# Patient Record
Sex: FEMALE | Race: BLACK OR AFRICAN AMERICAN | NOT HISPANIC OR LATINO | Employment: UNEMPLOYED | URBAN - METROPOLITAN AREA
[De-identification: names, ages, dates, MRNs, and addresses within clinical notes are randomized per-mention and may not be internally consistent; named-entity substitution may affect disease eponyms.]

---

## 2017-08-02 LAB
COLOR STL: NORMAL
CONSISTENCY STL: NORMAL
HEMOCCULT SP1 STL QL: NEGATIVE

## 2017-08-03 LAB
COLOR STL: NORMAL
CONSISTENCY STL: NORMAL
HEMOCCULT SP2 STL QL: NEGATIVE

## 2017-08-04 ENCOUNTER — HISTORICAL (OUTPATIENT)
Dept: INTERNAL MEDICINE | Facility: CLINIC | Age: 82
End: 2017-08-04

## 2017-08-04 LAB
COLOR STL: NORMAL
CONSISTENCY STL: NORMAL

## 2017-08-08 ENCOUNTER — HISTORICAL (OUTPATIENT)
Dept: ADMINISTRATIVE | Facility: HOSPITAL | Age: 82
End: 2017-08-08

## 2017-08-14 ENCOUNTER — HISTORICAL (OUTPATIENT)
Dept: INTERNAL MEDICINE | Facility: CLINIC | Age: 82
End: 2017-08-14

## 2017-08-14 LAB
ABS NEUT (OLG): 2.18 X10(3)/MCL (ref 2.1–9.2)
BASOPHILS # BLD AUTO: 0.04 X10(3)/MCL
BASOPHILS NFR BLD AUTO: 1 % (ref 0–1)
BUN SERPL-MCNC: 16 MG/DL (ref 7–18)
CALCIUM SERPL-MCNC: 9.6 MG/DL (ref 8.5–10.1)
CHLORIDE SERPL-SCNC: 107 MMOL/L (ref 98–107)
CHOLEST SERPL-MCNC: 172 MG/DL
CHOLEST/HDLC SERPL: 1.7 {RATIO} (ref 0–4.4)
CO2 SERPL-SCNC: 27 MMOL/L (ref 21–32)
CREAT SERPL-MCNC: 0.9 MG/DL (ref 0.6–1.3)
CRP SERPL-MCNC: <0.3 MG/DL
DEPRECATED CALCIDIOL+CALCIFEROL SERPL-MC: 42.36 NG/ML (ref 30–80)
EOSINOPHIL # BLD AUTO: 0.08 X10(3)/MCL
EOSINOPHIL NFR BLD AUTO: 2 % (ref 0–5)
ERYTHROCYTE [DISTWIDTH] IN BLOOD BY AUTOMATED COUNT: 14 % (ref 11.5–14.5)
ERYTHROCYTE [SEDIMENTATION RATE] IN BLOOD: 41 MM/HR (ref 0–20)
GLUCOSE SERPL-MCNC: 93 MG/DL (ref 74–106)
HCT VFR BLD AUTO: 31.6 % (ref 35–46)
HDLC SERPL-MCNC: 103 MG/DL
HGB BLD-MCNC: 10.6 GM/DL (ref 12–16)
LDLC SERPL CALC-MCNC: 58 MG/DL (ref 0–130)
LYMPHOCYTES # BLD AUTO: 1.73 X10(3)/MCL
LYMPHOCYTES NFR BLD AUTO: 40 % (ref 15–40)
MCH RBC QN AUTO: 27 PG (ref 26–34)
MCHC RBC AUTO-ENTMCNC: 33.5 GM/DL (ref 31–37)
MCV RBC AUTO: 80.4 FL (ref 80–100)
MONOCYTES # BLD AUTO: 0.28 X10(3)/MCL
MONOCYTES NFR BLD AUTO: 6 % (ref 4–12)
NEUTROPHILS # BLD AUTO: 2.18 X10(3)/MCL
NEUTROPHILS NFR BLD AUTO: 51 X10(3)/MCL
PLATELET # BLD AUTO: 408 X10(3)/MCL (ref 130–400)
PMV BLD AUTO: 8.1 FL (ref 7.4–10.4)
POTASSIUM SERPL-SCNC: 3.8 MMOL/L (ref 3.5–5.1)
RBC # BLD AUTO: 3.93 X10(6)/MCL (ref 4–5.2)
SODIUM SERPL-SCNC: 140 MMOL/L (ref 136–145)
TRIGL SERPL-MCNC: 53 MG/DL
VLDLC SERPL CALC-MCNC: 11 MG/DL
WBC # SPEC AUTO: 4.3 X10(3)/MCL (ref 4.5–11)

## 2017-08-23 ENCOUNTER — HISTORICAL (OUTPATIENT)
Dept: RADIOLOGY | Facility: HOSPITAL | Age: 82
End: 2017-08-23

## 2018-07-11 ENCOUNTER — HISTORICAL (OUTPATIENT)
Dept: LAB | Facility: HOSPITAL | Age: 83
End: 2018-07-11

## 2018-07-17 ENCOUNTER — HISTORICAL (OUTPATIENT)
Dept: INTERNAL MEDICINE | Facility: CLINIC | Age: 83
End: 2018-07-17

## 2018-07-17 LAB
ABS NEUT (OLG): 2.83 X10(3)/MCL (ref 2.1–9.2)
ALBUMIN SERPL-MCNC: 3.7 GM/DL (ref 3.4–5)
ALBUMIN/GLOB SERPL: 1 RATIO (ref 1–2)
ALP SERPL-CCNC: 56 UNIT/L (ref 45–117)
ALT SERPL-CCNC: 18 UNIT/L (ref 12–78)
AST SERPL-CCNC: 19 UNIT/L (ref 15–37)
BASOPHILS # BLD AUTO: 0.04 X10(3)/MCL
BASOPHILS NFR BLD AUTO: 1 %
BILIRUB SERPL-MCNC: 0.3 MG/DL (ref 0.2–1)
BILIRUBIN DIRECT+TOT PNL SERPL-MCNC: <0.1 MG/DL
BILIRUBIN DIRECT+TOT PNL SERPL-MCNC: ABNORMAL MG/DL
BUN SERPL-MCNC: 17 MG/DL (ref 7–18)
CALCIUM SERPL-MCNC: 9.1 MG/DL (ref 8.5–10.1)
CHLORIDE SERPL-SCNC: 105 MMOL/L (ref 98–107)
CO2 SERPL-SCNC: 30 MMOL/L (ref 21–32)
CREAT SERPL-MCNC: 1 MG/DL (ref 0.6–1.3)
EOSINOPHIL # BLD AUTO: 0.08 X10(3)/MCL
EOSINOPHIL NFR BLD AUTO: 2 %
ERYTHROCYTE [DISTWIDTH] IN BLOOD BY AUTOMATED COUNT: 13.7 % (ref 11.5–14.5)
FERRITIN SERPL-MCNC: 140.3 NG/ML (ref 8–388)
GLOBULIN SER-MCNC: 4.2 GM/ML (ref 2.3–3.5)
GLUCOSE SERPL-MCNC: 87 MG/DL (ref 74–106)
HCT VFR BLD AUTO: 37.3 % (ref 35–46)
HGB BLD-MCNC: 12.4 GM/DL (ref 12–16)
IMM GRANULOCYTES # BLD AUTO: 0.01 10*3/UL
IMM GRANULOCYTES NFR BLD AUTO: 0 %
IRON SATN MFR SERPL: 34.8 % (ref 15–50)
IRON SERPL-MCNC: 80 MCG/DL (ref 50–170)
LYMPHOCYTES # BLD AUTO: 2.1 X10(3)/MCL
LYMPHOCYTES NFR BLD AUTO: 39 % (ref 13–40)
MCH RBC QN AUTO: 27.7 PG (ref 26–34)
MCHC RBC AUTO-ENTMCNC: 33.2 GM/DL (ref 31–37)
MCV RBC AUTO: 83.3 FL (ref 80–100)
MONOCYTES # BLD AUTO: 0.35 X10(3)/MCL
MONOCYTES NFR BLD AUTO: 6 % (ref 4–12)
NEUTROPHILS # BLD AUTO: 2.83 X10(3)/MCL
NEUTROPHILS NFR BLD AUTO: 52 X10(3)/MCL
PLATELET # BLD AUTO: 289 X10(3)/MCL (ref 130–400)
PMV BLD AUTO: 8.6 FL (ref 7.4–10.4)
POTASSIUM SERPL-SCNC: 4.2 MMOL/L (ref 3.5–5.1)
PROT SERPL-MCNC: 7.9 GM/DL (ref 6.4–8.2)
RBC # BLD AUTO: 4.48 X10(6)/MCL (ref 4–5.2)
SODIUM SERPL-SCNC: 139 MMOL/L (ref 136–145)
TIBC SERPL-MCNC: 230 MCG/DL (ref 250–450)
TRANSFERRIN SERPL-MCNC: 202 MG/DL (ref 200–360)
WBC # SPEC AUTO: 5.4 X10(3)/MCL (ref 4.5–11)

## 2019-02-21 ENCOUNTER — HISTORICAL (OUTPATIENT)
Dept: INTERNAL MEDICINE | Facility: CLINIC | Age: 84
End: 2019-02-21

## 2019-02-21 LAB
ABS NEUT (OLG): 1.98 X10(3)/MCL (ref 2.1–9.2)
ALBUMIN SERPL-MCNC: 3.3 GM/DL (ref 3.4–5)
ALBUMIN/GLOB SERPL: 0.8 RATIO (ref 1.1–2)
ALP SERPL-CCNC: 51 UNIT/L (ref 45–117)
ALT SERPL-CCNC: 18 UNIT/L (ref 12–78)
AST SERPL-CCNC: 21 UNIT/L (ref 15–37)
BASOPHILS # BLD AUTO: 0.02 X10(3)/MCL
BASOPHILS NFR BLD AUTO: 0 %
BILIRUB SERPL-MCNC: 0.2 MG/DL (ref 0.2–1)
BILIRUBIN DIRECT+TOT PNL SERPL-MCNC: <0.1 MG/DL
BILIRUBIN DIRECT+TOT PNL SERPL-MCNC: ABNORMAL MG/DL
BUN SERPL-MCNC: 19 MG/DL (ref 7–18)
CALCIUM SERPL-MCNC: 9.2 MG/DL (ref 8.5–10.1)
CHLORIDE SERPL-SCNC: 106 MMOL/L (ref 98–107)
CHOLEST SERPL-MCNC: 156 MG/DL
CHOLEST/HDLC SERPL: 2.1 {RATIO} (ref 0–4.4)
CO2 SERPL-SCNC: 29 MMOL/L (ref 21–32)
CREAT SERPL-MCNC: 0.9 MG/DL (ref 0.6–1.3)
EOSINOPHIL # BLD AUTO: 0.11 X10(3)/MCL
EOSINOPHIL NFR BLD AUTO: 3 %
ERYTHROCYTE [DISTWIDTH] IN BLOOD BY AUTOMATED COUNT: 13.4 % (ref 11.5–14.5)
GLOBULIN SER-MCNC: 4.3 GM/ML (ref 2.3–3.5)
GLUCOSE SERPL-MCNC: 80 MG/DL (ref 74–106)
HCT VFR BLD AUTO: 35.3 % (ref 35–46)
HDLC SERPL-MCNC: 73 MG/DL
HGB BLD-MCNC: 11.7 GM/DL (ref 12–16)
IMM GRANULOCYTES # BLD AUTO: 0.02 10*3/UL
IMM GRANULOCYTES NFR BLD AUTO: 0 %
LDLC SERPL CALC-MCNC: 68 MG/DL (ref 0–130)
LYMPHOCYTES # BLD AUTO: 1.86 X10(3)/MCL
LYMPHOCYTES NFR BLD AUTO: 44 % (ref 13–40)
MCH RBC QN AUTO: 27.9 PG (ref 26–34)
MCHC RBC AUTO-ENTMCNC: 33.1 GM/DL (ref 31–37)
MCV RBC AUTO: 84.2 FL (ref 80–100)
MONOCYTES # BLD AUTO: 0.26 X10(3)/MCL
MONOCYTES NFR BLD AUTO: 6 % (ref 4–12)
NEUTROPHILS # BLD AUTO: 1.98 X10(3)/MCL
NEUTROPHILS NFR BLD AUTO: 46 X10(3)/MCL
PLATELET # BLD AUTO: 279 X10(3)/MCL (ref 130–400)
PMV BLD AUTO: 8.5 FL (ref 7.4–10.4)
POTASSIUM SERPL-SCNC: 3.7 MMOL/L (ref 3.5–5.1)
PROT SERPL-MCNC: 7.6 GM/DL (ref 6.4–8.2)
RBC # BLD AUTO: 4.19 X10(6)/MCL (ref 4–5.2)
SODIUM SERPL-SCNC: 141 MMOL/L (ref 136–145)
TRIGL SERPL-MCNC: 73 MG/DL
VLDLC SERPL CALC-MCNC: 15 MG/DL
WBC # SPEC AUTO: 4.2 X10(3)/MCL (ref 4.5–11)

## 2019-08-12 ENCOUNTER — HISTORICAL (OUTPATIENT)
Dept: ADMINISTRATIVE | Facility: HOSPITAL | Age: 84
End: 2019-08-12

## 2019-08-12 LAB — DEPRECATED CALCIDIOL+CALCIFEROL SERPL-MC: 73.61 NG/ML (ref 30–80)

## 2019-09-05 ENCOUNTER — HISTORICAL (OUTPATIENT)
Dept: RADIOLOGY | Facility: HOSPITAL | Age: 84
End: 2019-09-05

## 2019-09-09 ENCOUNTER — HISTORICAL (OUTPATIENT)
Dept: INTERNAL MEDICINE | Facility: CLINIC | Age: 84
End: 2019-09-09

## 2020-02-20 ENCOUNTER — HISTORICAL (OUTPATIENT)
Dept: INTERNAL MEDICINE | Facility: CLINIC | Age: 85
End: 2020-02-20

## 2020-02-20 LAB
ABS NEUT (OLG): 2.24 X10(3)/MCL (ref 2.1–9.2)
BASOPHILS # BLD AUTO: 0 X10(3)/MCL (ref 0–0.2)
BASOPHILS NFR BLD AUTO: 0 %
BUN SERPL-MCNC: 16 MG/DL (ref 7–18)
CALCIUM SERPL-MCNC: 9.8 MG/DL (ref 8.5–10.1)
CHLORIDE SERPL-SCNC: 106 MMOL/L (ref 98–107)
CO2 SERPL-SCNC: 30 MMOL/L (ref 21–32)
CREAT SERPL-MCNC: 1 MG/DL (ref 0.6–1.3)
CREAT UR-MCNC: 66 MG/DL
CREAT/UREA NIT SERPL: 16
DEPRECATED CALCIDIOL+CALCIFEROL SERPL-MC: 61.9 NG/ML (ref 30–80)
EOSINOPHIL # BLD AUTO: 0.1 X10(3)/MCL (ref 0–0.9)
EOSINOPHIL NFR BLD AUTO: 2 %
ERYTHROCYTE [DISTWIDTH] IN BLOOD BY AUTOMATED COUNT: 14.1 % (ref 11.5–14.5)
GLUCOSE SERPL-MCNC: 83 MG/DL (ref 74–106)
HCT VFR BLD AUTO: 35.6 % (ref 35–46)
HGB BLD-MCNC: 11.5 GM/DL (ref 12–16)
IMM GRANULOCYTES # BLD AUTO: 0.01 10*3/UL
IMM GRANULOCYTES NFR BLD AUTO: 0 %
LYMPHOCYTES # BLD AUTO: 1.7 X10(3)/MCL (ref 0.6–4.6)
LYMPHOCYTES NFR BLD AUTO: 38 %
MCH RBC QN AUTO: 27.2 PG (ref 26–34)
MCHC RBC AUTO-ENTMCNC: 32.3 GM/DL (ref 31–37)
MCV RBC AUTO: 84.2 FL (ref 80–100)
MICROALBUMIN UR-MCNC: <5 MG/L (ref 0–19)
MICROALBUMIN/CREAT RATIO PNL UR: <7.6 MCG/MG CR (ref 0–29)
MONOCYTES # BLD AUTO: 0.4 X10(3)/MCL (ref 0.1–1.3)
MONOCYTES NFR BLD AUTO: 8 %
NEUTROPHILS # BLD AUTO: 2.24 X10(3)/MCL (ref 2.1–9.2)
NEUTROPHILS NFR BLD AUTO: 50 %
PLATELET # BLD AUTO: 294 X10(3)/MCL (ref 130–400)
PMV BLD AUTO: 8.5 FL (ref 7.4–10.4)
POTASSIUM SERPL-SCNC: 4.4 MMOL/L (ref 3.5–5.1)
RBC # BLD AUTO: 4.23 X10(6)/MCL (ref 4–5.2)
SODIUM SERPL-SCNC: 139 MMOL/L (ref 136–145)
WBC # SPEC AUTO: 4.4 X10(3)/MCL (ref 4.5–11)

## 2020-08-04 ENCOUNTER — HISTORICAL (OUTPATIENT)
Dept: INTERNAL MEDICINE | Facility: CLINIC | Age: 85
End: 2020-08-04

## 2020-08-04 LAB
ABS NEUT (OLG): 2 X10(3)/MCL (ref 2.1–9.2)
BASOPHILS # BLD AUTO: 0 X10(3)/MCL (ref 0–0.2)
BASOPHILS NFR BLD AUTO: 1 %
BUN SERPL-MCNC: 28 MG/DL (ref 7–18)
CALCIUM SERPL-MCNC: 9.6 MG/DL (ref 8.5–10.1)
CHLORIDE SERPL-SCNC: 108 MMOL/L (ref 98–107)
CO2 SERPL-SCNC: 29 MMOL/L (ref 21–32)
CREAT SERPL-MCNC: 1.2 MG/DL (ref 0.6–1.3)
CREAT/UREA NIT SERPL: 23 MG/DL (ref 12–14)
EOSINOPHIL # BLD AUTO: 0.1 X10(3)/MCL (ref 0–0.9)
EOSINOPHIL NFR BLD AUTO: 3 %
ERYTHROCYTE [DISTWIDTH] IN BLOOD BY AUTOMATED COUNT: 14.6 % (ref 11.5–14.5)
GLUCOSE SERPL-MCNC: 82 MG/DL (ref 74–106)
HCT VFR BLD AUTO: 34.7 % (ref 35–46)
HGB BLD-MCNC: 11.4 GM/DL (ref 12–16)
IMM GRANULOCYTES # BLD AUTO: 0.01 10*3/UL
IMM GRANULOCYTES NFR BLD AUTO: 0 %
LYMPHOCYTES # BLD AUTO: 1.9 X10(3)/MCL (ref 0.6–4.6)
LYMPHOCYTES NFR BLD AUTO: 44 %
MCH RBC QN AUTO: 27.1 PG (ref 26–34)
MCHC RBC AUTO-ENTMCNC: 32.9 GM/DL (ref 31–37)
MCV RBC AUTO: 82.6 FL (ref 80–100)
MONOCYTES # BLD AUTO: 0.2 X10(3)/MCL (ref 0.1–1.3)
MONOCYTES NFR BLD AUTO: 6 %
NEUTROPHILS # BLD AUTO: 2 X10(3)/MCL (ref 2.1–9.2)
NEUTROPHILS NFR BLD AUTO: 47 %
PLATELET # BLD AUTO: 278 X10(3)/MCL (ref 130–400)
PMV BLD AUTO: 8.5 FL (ref 7.4–10.4)
POTASSIUM SERPL-SCNC: 4.5 MMOL/L (ref 3.5–5.1)
RBC # BLD AUTO: 4.2 X10(6)/MCL (ref 4–5.2)
SODIUM SERPL-SCNC: 139 MMOL/L (ref 136–145)
WBC # SPEC AUTO: 4.3 X10(3)/MCL (ref 4.5–11)

## 2020-09-28 ENCOUNTER — HISTORICAL (OUTPATIENT)
Dept: RADIOLOGY | Facility: HOSPITAL | Age: 85
End: 2020-09-28

## 2020-09-28 LAB
ABS NEUT (OLG): 2.31 X10(3)/MCL (ref 2.1–9.2)
ALBUMIN SERPL-MCNC: 3.7 GM/DL (ref 3.4–5)
ALBUMIN/GLOB SERPL: 0.9 RATIO (ref 1.1–2)
ALP SERPL-CCNC: 53 UNIT/L (ref 45–117)
ALT SERPL-CCNC: 16 UNIT/L (ref 12–78)
APPEARANCE, UA: CLEAR
AST SERPL-CCNC: 19 UNIT/L (ref 15–37)
BACTERIA #/AREA URNS AUTO: ABNORMAL /HPF
BASOPHILS # BLD AUTO: 0 X10(3)/MCL (ref 0–0.2)
BASOPHILS NFR BLD AUTO: 1 %
BILIRUB SERPL-MCNC: 0.4 MG/DL (ref 0.2–1)
BILIRUB UR QL STRIP: NEGATIVE
BILIRUBIN DIRECT+TOT PNL SERPL-MCNC: 0.1 MG/DL (ref 0–0.2)
BILIRUBIN DIRECT+TOT PNL SERPL-MCNC: 0.3 MG/DL
BUN SERPL-MCNC: 23 MG/DL (ref 7–18)
CALCIUM SERPL-MCNC: 9.8 MG/DL (ref 8.5–10.1)
CHLORIDE SERPL-SCNC: 107 MMOL/L (ref 98–107)
CO2 SERPL-SCNC: 29 MMOL/L (ref 21–32)
COLOR UR: NORMAL
CREAT SERPL-MCNC: 1.2 MG/DL (ref 0.6–1.3)
DEPRECATED CALCIDIOL+CALCIFEROL SERPL-MC: 70.1 NG/ML (ref 30–80)
EOSINOPHIL # BLD AUTO: 0.1 X10(3)/MCL (ref 0–0.9)
EOSINOPHIL NFR BLD AUTO: 3 %
ERYTHROCYTE [DISTWIDTH] IN BLOOD BY AUTOMATED COUNT: 14.4 % (ref 11.5–14.5)
GLOBULIN SER-MCNC: 3.9 GM/ML (ref 2.3–3.5)
GLUCOSE (UA): NEGATIVE
GLUCOSE SERPL-MCNC: 88 MG/DL (ref 74–106)
HCT VFR BLD AUTO: 34.6 % (ref 35–46)
HGB BLD-MCNC: 11.4 GM/DL (ref 12–16)
HGB UR QL STRIP: NEGATIVE
HYALINE CASTS #/AREA URNS LPF: ABNORMAL /LPF
KETONES UR QL STRIP: NEGATIVE
LEUKOCYTE ESTERASE UR QL STRIP: NEGATIVE
LYMPHOCYTES # BLD AUTO: 1.8 X10(3)/MCL (ref 0.6–4.6)
LYMPHOCYTES NFR BLD AUTO: 38 %
MCH RBC QN AUTO: 27.5 PG (ref 26–34)
MCHC RBC AUTO-ENTMCNC: 32.9 GM/DL (ref 31–37)
MCV RBC AUTO: 83.4 FL (ref 80–100)
MONOCYTES # BLD AUTO: 0.4 X10(3)/MCL (ref 0.1–1.3)
MONOCYTES NFR BLD AUTO: 8 %
NEUTROPHILS # BLD AUTO: 2.31 X10(3)/MCL (ref 2.1–9.2)
NEUTROPHILS NFR BLD AUTO: 50 %
NITRITE UR QL STRIP: NEGATIVE
PH UR STRIP: 7.5 [PH] (ref 4.5–8)
PLATELET # BLD AUTO: 291 X10(3)/MCL (ref 130–400)
PMV BLD AUTO: 8.9 FL (ref 7.4–10.4)
POTASSIUM SERPL-SCNC: 4.4 MMOL/L (ref 3.5–5.1)
PROT SERPL-MCNC: 7.6 GM/DL (ref 6.4–8.2)
PROT UR QL STRIP: NEGATIVE
RBC # BLD AUTO: 4.15 X10(6)/MCL (ref 4–5.2)
RBC #/AREA URNS AUTO: ABNORMAL /HPF
SODIUM SERPL-SCNC: 139 MMOL/L (ref 136–145)
SP GR UR STRIP: 1.01 (ref 1–1.03)
SQUAMOUS #/AREA URNS LPF: ABNORMAL /LPF
T4 SERPL-MCNC: 9.5 MCG/DL (ref 4.8–13.9)
TSH SERPL-ACNC: 0.82 MIU/L (ref 0.36–3.74)
UROBILINOGEN UR STRIP-ACNC: NORMAL
VIT B12 SERPL-MCNC: 749 PG/ML (ref 193–986)
WBC # SPEC AUTO: 4.6 X10(3)/MCL (ref 4.5–11)
WBC #/AREA URNS AUTO: ABNORMAL /HPF

## 2020-11-02 ENCOUNTER — HISTORICAL (OUTPATIENT)
Dept: RADIOLOGY | Facility: HOSPITAL | Age: 85
End: 2020-11-02

## 2020-11-06 ENCOUNTER — HISTORICAL (OUTPATIENT)
Dept: ADMINISTRATIVE | Facility: HOSPITAL | Age: 85
End: 2020-11-06

## 2020-11-06 LAB
HAV IGM SERPL QL IA: NONREACTIVE
HBV CORE IGM SERPL QL IA: NONREACTIVE
HBV SURFACE AG SERPL QL IA: NONREACTIVE
HCV AB SERPL QL IA: NONREACTIVE
HIV 1+2 AB+HIV1 P24 AG SERPL QL IA: NONREACTIVE
T PALLIDUM AB SER QL: NONREACTIVE

## 2020-11-30 ENCOUNTER — HISTORICAL (OUTPATIENT)
Dept: RADIOLOGY | Facility: HOSPITAL | Age: 85
End: 2020-11-30

## 2020-12-11 ENCOUNTER — HISTORICAL (OUTPATIENT)
Dept: INFUSION THERAPY | Facility: HOSPITAL | Age: 85
End: 2020-12-11

## 2021-03-25 ENCOUNTER — HISTORICAL (OUTPATIENT)
Dept: ADMINISTRATIVE | Facility: HOSPITAL | Age: 86
End: 2021-03-25

## 2021-03-25 LAB
ABS NEUT (OLG): 1.96 X10(3)/MCL (ref 2.1–9.2)
ALBUMIN SERPL-MCNC: 3.6 GM/DL (ref 3.4–4.8)
ALBUMIN/GLOB SERPL: 0.9 RATIO (ref 1.1–2)
ALP SERPL-CCNC: 49 UNIT/L (ref 40–150)
ALT SERPL-CCNC: 12 UNIT/L (ref 0–55)
AST SERPL-CCNC: 21 UNIT/L (ref 5–34)
BASOPHILS # BLD AUTO: 0 X10(3)/MCL (ref 0–0.2)
BASOPHILS NFR BLD AUTO: 0 %
BILIRUB SERPL-MCNC: 0.3 MG/DL
BILIRUBIN DIRECT+TOT PNL SERPL-MCNC: 0.1 MG/DL (ref 0–0.8)
BILIRUBIN DIRECT+TOT PNL SERPL-MCNC: 0.2 MG/DL (ref 0–0.5)
BUN SERPL-MCNC: 16.2 MG/DL (ref 9.8–20.1)
CALCIUM SERPL-MCNC: 9.5 MG/DL (ref 8.4–10.2)
CHLORIDE SERPL-SCNC: 103 MMOL/L (ref 98–111)
CHOLEST SERPL-MCNC: 199 MG/DL
CHOLEST/HDLC SERPL: 3 {RATIO} (ref 0–5)
CO2 SERPL-SCNC: 28 MMOL/L (ref 23–31)
CREAT SERPL-MCNC: 0.98 MG/DL (ref 0.55–1.02)
DEPRECATED CALCIDIOL+CALCIFEROL SERPL-MC: 59.2 NG/ML (ref 30–80)
EOSINOPHIL # BLD AUTO: 0.1 X10(3)/MCL (ref 0–0.9)
EOSINOPHIL NFR BLD AUTO: 2 %
ERYTHROCYTE [DISTWIDTH] IN BLOOD BY AUTOMATED COUNT: 14.6 % (ref 11.5–14.5)
FOLATE SERPL-MCNC: 4.5 NG/ML (ref 7–31.4)
GLOBULIN SER-MCNC: 3.8 GM/DL (ref 2.4–3.5)
GLUCOSE SERPL-MCNC: 81 MG/DL (ref 75–121)
HCT VFR BLD AUTO: 37.1 % (ref 35–46)
HDLC SERPL-MCNC: 78 MG/DL (ref 35–60)
HGB BLD-MCNC: 12 GM/DL (ref 12–16)
LDLC SERPL CALC-MCNC: 106 MG/DL (ref 50–140)
LYMPHOCYTES # BLD AUTO: 1.6 X10(3)/MCL (ref 0.6–4.6)
LYMPHOCYTES NFR BLD AUTO: 41 %
MCH RBC QN AUTO: 26.9 PG (ref 26–34)
MCHC RBC AUTO-ENTMCNC: 32.3 GM/DL (ref 31–37)
MCV RBC AUTO: 83.2 FL (ref 80–100)
MONOCYTES # BLD AUTO: 0.3 X10(3)/MCL (ref 0.1–1.3)
MONOCYTES NFR BLD AUTO: 7 %
NEUTROPHILS # BLD AUTO: 1.96 X10(3)/MCL (ref 2.1–9.2)
NEUTROPHILS NFR BLD AUTO: 50 %
PLATELET # BLD AUTO: 331 X10(3)/MCL (ref 130–400)
PMV BLD AUTO: 8.6 FL (ref 7.4–10.4)
POTASSIUM SERPL-SCNC: 4.2 MMOL/L (ref 3.5–5.1)
PROT SERPL-MCNC: 7.4 GM/DL (ref 5.8–7.6)
RBC # BLD AUTO: 4.46 X10(6)/MCL (ref 4–5.2)
SODIUM SERPL-SCNC: 136 MMOL/L (ref 132–146)
TRIGL SERPL-MCNC: 73 MG/DL (ref 37–140)
TSH SERPL-ACNC: 0.61 UIU/ML (ref 0.35–4.94)
VIT B12 SERPL-MCNC: 637 PG/ML (ref 213–816)
VLDLC SERPL CALC-MCNC: 15 MG/DL
WBC # SPEC AUTO: 3.9 X10(3)/MCL (ref 4.5–11)

## 2021-04-27 ENCOUNTER — HISTORICAL (OUTPATIENT)
Dept: RADIOLOGY | Facility: HOSPITAL | Age: 86
End: 2021-04-27

## 2021-05-11 ENCOUNTER — HISTORICAL (OUTPATIENT)
Dept: ADMINISTRATIVE | Facility: HOSPITAL | Age: 86
End: 2021-05-11

## 2021-05-11 LAB
ABS NEUT (OLG): 2.32 X10(3)/MCL (ref 2.1–9.2)
ALBUMIN SERPL-MCNC: 3.7 GM/DL (ref 3.4–4.8)
ALBUMIN/GLOB SERPL: 1 RATIO (ref 1.1–2)
ALP SERPL-CCNC: 52 UNIT/L (ref 40–150)
ALT SERPL-CCNC: 11 UNIT/L (ref 0–55)
APPEARANCE, UA: CLEAR
AST SERPL-CCNC: 22 UNIT/L (ref 5–34)
BACTERIA #/AREA URNS AUTO: ABNORMAL /HPF
BASOPHILS # BLD AUTO: 0 X10(3)/MCL (ref 0–0.2)
BASOPHILS NFR BLD AUTO: 1 %
BILIRUB SERPL-MCNC: 0.6 MG/DL
BILIRUB UR QL STRIP: NEGATIVE
BILIRUBIN DIRECT+TOT PNL SERPL-MCNC: 0.3 MG/DL (ref 0–0.5)
BILIRUBIN DIRECT+TOT PNL SERPL-MCNC: 0.3 MG/DL (ref 0–0.8)
BUN SERPL-MCNC: 19.6 MG/DL (ref 9.8–20.1)
CALCIUM SERPL-MCNC: 9.7 MG/DL (ref 8.4–10.2)
CHLORIDE SERPL-SCNC: 108 MMOL/L (ref 98–111)
CO2 SERPL-SCNC: 25 MMOL/L (ref 23–31)
COLOR UR: NORMAL
CREAT SERPL-MCNC: 1.27 MG/DL (ref 0.55–1.02)
EOSINOPHIL # BLD AUTO: 0.1 X10(3)/MCL (ref 0–0.9)
EOSINOPHIL NFR BLD AUTO: 2 %
ERYTHROCYTE [DISTWIDTH] IN BLOOD BY AUTOMATED COUNT: 14.2 % (ref 11.5–14.5)
GLOBULIN SER-MCNC: 3.7 GM/DL (ref 2.4–3.5)
GLUCOSE (UA): NEGATIVE
GLUCOSE SERPL-MCNC: 94 MG/DL (ref 75–121)
HCT VFR BLD AUTO: 37.8 % (ref 35–46)
HGB BLD-MCNC: 12.3 GM/DL (ref 12–16)
HGB UR QL STRIP: NEGATIVE
HYALINE CASTS #/AREA URNS LPF: ABNORMAL /LPF
IMM GRANULOCYTES # BLD AUTO: 0.04 10*3/UL
IMM GRANULOCYTES NFR BLD AUTO: 1 %
KETONES UR QL STRIP: NEGATIVE
LEUKOCYTE ESTERASE UR QL STRIP: NEGATIVE
LYMPHOCYTES # BLD AUTO: 1.5 X10(3)/MCL (ref 0.6–4.6)
LYMPHOCYTES NFR BLD AUTO: 35 %
MCH RBC QN AUTO: 27.2 PG (ref 26–34)
MCHC RBC AUTO-ENTMCNC: 32.5 GM/DL (ref 31–37)
MCV RBC AUTO: 83.4 FL (ref 80–100)
MONOCYTES # BLD AUTO: 0.4 X10(3)/MCL (ref 0.1–1.3)
MONOCYTES NFR BLD AUTO: 8 %
NEUTROPHILS # BLD AUTO: 2.32 X10(3)/MCL (ref 2.1–9.2)
NEUTROPHILS NFR BLD AUTO: 53 %
NITRITE UR QL STRIP: NEGATIVE
PH UR STRIP: 6 [PH] (ref 4.5–8)
PLATELET # BLD AUTO: 271 X10(3)/MCL (ref 130–400)
PMV BLD AUTO: 8.6 FL (ref 7.4–10.4)
POTASSIUM SERPL-SCNC: 4.1 MMOL/L (ref 3.5–5.1)
PROT SERPL-MCNC: 7.4 GM/DL (ref 5.8–7.6)
PROT UR QL STRIP: NEGATIVE
RBC # BLD AUTO: 4.53 X10(6)/MCL (ref 4–5.2)
RBC #/AREA URNS AUTO: ABNORMAL /HPF
SODIUM SERPL-SCNC: 140 MMOL/L (ref 132–146)
SP GR UR STRIP: 1.01 (ref 1–1.03)
SQUAMOUS #/AREA URNS LPF: ABNORMAL /LPF
UROBILINOGEN UR STRIP-ACNC: NORMAL
WBC # SPEC AUTO: 4.4 X10(3)/MCL (ref 4.5–11)
WBC #/AREA URNS AUTO: ABNORMAL /HPF

## 2021-05-13 LAB — FINAL CULTURE: NORMAL

## 2021-06-11 ENCOUNTER — HISTORICAL (OUTPATIENT)
Dept: INFUSION THERAPY | Facility: HOSPITAL | Age: 86
End: 2021-06-11

## 2021-07-09 ENCOUNTER — HISTORICAL (OUTPATIENT)
Dept: INTERNAL MEDICINE | Facility: CLINIC | Age: 86
End: 2021-07-09

## 2021-07-09 LAB
ABS NEUT (OLG): 3.24 X10(3)/MCL (ref 2.1–9.2)
ALBUMIN SERPL-MCNC: 3.6 GM/DL (ref 3.4–4.8)
ALBUMIN/GLOB SERPL: 0.9 RATIO (ref 1.1–2)
ALP SERPL-CCNC: 46 UNIT/L (ref 40–150)
ALT SERPL-CCNC: 10 UNIT/L (ref 0–55)
AST SERPL-CCNC: 19 UNIT/L (ref 5–34)
BASOPHILS # BLD AUTO: 0 X10(3)/MCL (ref 0–0.2)
BASOPHILS NFR BLD AUTO: 1 %
BILIRUB SERPL-MCNC: 0.4 MG/DL
BILIRUBIN DIRECT+TOT PNL SERPL-MCNC: 0.2 MG/DL (ref 0–0.5)
BILIRUBIN DIRECT+TOT PNL SERPL-MCNC: 0.2 MG/DL (ref 0–0.8)
BUN SERPL-MCNC: 19.4 MG/DL (ref 9.8–20.1)
CALCIUM SERPL-MCNC: 9.8 MG/DL (ref 8.4–10.2)
CHLORIDE SERPL-SCNC: 109 MMOL/L (ref 98–111)
CO2 SERPL-SCNC: 27 MMOL/L (ref 23–31)
CREAT SERPL-MCNC: 1.23 MG/DL (ref 0.55–1.02)
DEPRECATED CALCIDIOL+CALCIFEROL SERPL-MC: 56.1 NG/ML (ref 30–80)
EOSINOPHIL # BLD AUTO: 0.1 X10(3)/MCL (ref 0–0.9)
EOSINOPHIL NFR BLD AUTO: 1 %
ERYTHROCYTE [DISTWIDTH] IN BLOOD BY AUTOMATED COUNT: 15.1 % (ref 11.5–14.5)
EST. AVERAGE GLUCOSE BLD GHB EST-MCNC: 96.8 MG/DL
GLOBULIN SER-MCNC: 3.8 GM/DL (ref 2.4–3.5)
GLUCOSE SERPL-MCNC: 91 MG/DL (ref 75–121)
HBA1C MFR BLD: 5 %
HCT VFR BLD AUTO: 36.5 % (ref 35–46)
HGB BLD-MCNC: 11.8 GM/DL (ref 12–16)
IMM GRANULOCYTES # BLD AUTO: 0.01 10*3/UL
IMM GRANULOCYTES NFR BLD AUTO: 0 %
LYMPHOCYTES # BLD AUTO: 1.6 X10(3)/MCL (ref 0.6–4.6)
LYMPHOCYTES NFR BLD AUTO: 31 %
MCH RBC QN AUTO: 27.1 PG (ref 26–34)
MCHC RBC AUTO-ENTMCNC: 32.3 GM/DL (ref 31–37)
MCV RBC AUTO: 83.9 FL (ref 80–100)
MONOCYTES # BLD AUTO: 0.3 X10(3)/MCL (ref 0.1–1.3)
MONOCYTES NFR BLD AUTO: 6 %
NEUTROPHILS # BLD AUTO: 3.24 X10(3)/MCL (ref 2.1–9.2)
NEUTROPHILS NFR BLD AUTO: 61 %
NRBC BLD AUTO-RTO: 0 % (ref 0–0.2)
PLATELET # BLD AUTO: 287 X10(3)/MCL (ref 130–400)
PMV BLD AUTO: 8.4 FL (ref 7.4–10.4)
POTASSIUM SERPL-SCNC: 4.7 MMOL/L (ref 3.5–5.1)
PROT SERPL-MCNC: 7.4 GM/DL (ref 5.8–7.6)
RBC # BLD AUTO: 4.35 X10(6)/MCL (ref 4–5.2)
SODIUM SERPL-SCNC: 142 MMOL/L (ref 132–146)
VIT B12 SERPL-MCNC: 791 PG/ML (ref 213–816)
WBC # SPEC AUTO: 5.3 X10(3)/MCL (ref 4.5–11)

## 2021-07-13 ENCOUNTER — HISTORICAL (OUTPATIENT)
Dept: RADIOLOGY | Facility: HOSPITAL | Age: 86
End: 2021-07-13

## 2022-02-14 ENCOUNTER — HISTORICAL (OUTPATIENT)
Dept: ADMINISTRATIVE | Facility: HOSPITAL | Age: 87
End: 2022-02-14

## 2022-02-14 ENCOUNTER — HISTORICAL (OUTPATIENT)
Dept: RADIOLOGY | Facility: HOSPITAL | Age: 87
End: 2022-02-14

## 2022-04-10 ENCOUNTER — HISTORICAL (OUTPATIENT)
Dept: ADMINISTRATIVE | Facility: HOSPITAL | Age: 87
End: 2022-04-10
Payer: COMMERCIAL

## 2022-04-13 DIAGNOSIS — M81.0 AGE RELATED OSTEOPOROSIS, UNSPECIFIED PATHOLOGICAL FRACTURE PRESENCE: ICD-10-CM

## 2022-04-26 VITALS
WEIGHT: 120.56 LBS | DIASTOLIC BLOOD PRESSURE: 64 MMHG | OXYGEN SATURATION: 100 % | SYSTOLIC BLOOD PRESSURE: 103 MMHG | HEIGHT: 59 IN | BODY MASS INDEX: 24.31 KG/M2

## 2022-04-30 NOTE — PROGRESS NOTES
86-year-old black female patient admitted to the hospital in June 2017 with right upper lobe infiltrate and possible lung abscess.  Patient was discharged home on antibiotics.  She tells me she took him for a week or 2 but not long-term.  There are no notes that I can see what antibiotic or how long she took him.  She is completely asymptomatic from from this standpoint.  She specifically denies hemoptysis or mucopurulent sputum or chest pain.  Appetite is good.  Physical exam  HEENT: Unremarkable chest: Clear heart: Regular in rhythm abdomen: Scaphoid extremities: No edema  Laboratory CT scan of the chest is ordered August 14 of 2017.  Impression right upper lobe infiltrate/abscess June 2017.  Plan patient needs to get her outpatient CT chest is will be reviewed by pulmonary doctor here that week if there is an abnormality she will be scheduled for pulmonary clinic if it is completely cleared she will be discharged from the clinic.  There will be no charge for to today's visit

## 2022-05-03 NOTE — HISTORICAL [facility] CERNER
This is a historical note converted from [place]. Formatting and pictures may have been removed.  Please reference [employer] for original formatting and attached multimedia. History of Present Illness  89-year-old female with history of dementia, HTN, osteoporosis, HLD, and unsteady gait presents to geriatric clinic for follow-up. ?Daughter present. Previously seen on 12/17/2020 in [facility], started on donepezil 5 mg at that time. ?SLUMS score 4 then.  ?   -Received COVID-19 both shots  ?   Dementia:  -?SLUMS score of 3, unable to perform Trails task  -Initially talking with patient and daughter in room with the appearance that patient was doing well.? They were unsure patient had diagnosis of dementia and that patient was able to dress herself, bathe herself, cooked at times.? Once we got daughter out of the room, the daughter stated she did not like to talk about the dementia in front of her mother because mother would get agitated.? Daughters states mother is forgetful, will clean clothes in the washer and then forget that they are clean and rewash them.? She probably does not remember to bathe. ?Patient has left things on the stove too long and burned them, so family has told her that doctor does not want her to cook and she has not been cooking lately.??She will be agitated at night and very paranoid about money. Pt does not want anyone to help her with paying bills. I called the son who lives with the patient.? He agreed with what the daughter has stated and says patient will be in her room and seem to be talking to someone but no one is there.? Since starting Donepezil that has helped her he believes in that her agitation is not as much. Pt has 24hr care.  ?   Htn:  -Currently on Lisinopril 5mg  ?   Osteoporosis:  -Currently on Calcium Carbonate 500mg? qd  ?  ?  Geriatric Assessment  -Independent ADLs  -Cannot do IDLS, does not drive  -She does not cook, family will get her plate lunches  -She has good appetite, does  not eat like when she was younger  -Sleeping well  -No falls, not off balance, uses rolling walker everywhere  -Mood is great, enjoys doing regular activities  -Lives with son.  -Denies alcohol, denies smoking, denies drugs  ?  ?  Review of Systems  Gen: no recent fevers or chills  HEENT: no headaches  Card: no chest pain  Resp: no sob  Abd: no n/v/d  Msk: no swelling  Psyche: no depressive thoughts  Physical Exam  Vitals & Measurements  T:?36.7? ?C (Oral)? RR:?20? BP:?137/61? SpO2:?100%?  HT:?150.00?cm? WT:?58.800?kg? BMI:?26.13?  Gen: alert and oriented, pleasant  HEENT: no scleral icterus  Neck: supple  Card: 3/6 systolic murmur best heard at second intercostal space on the right  Resp: clear breath sounds bilaterally  Abd: soft, nontender to palpation  Neuro: CN2-12 grossly intact, (-) disdiadokinesis, (-) finger to nose, (-) heel to shin  Assessment/Plan  1.?HTN (hypertension)?I10  -Continue Lisinopril 5mg  Ordered:  Clinic Follow up, *Est. 05/25/21 3:00:00 CDT, Order for future visit, AD - Alzheimers disease  HTN (hypertension)  Osteoporosis, [facility]  ?  2.?Health care maintenance?Z00.00  ?-Received both COVID vaccines  ?-Routine labs ordered today  ?  3.?Newly recognized heart murmur?R01.1  ?-Echocardiogram complete ordered  Ordered:  Echocardiogram Complete Adult, 03/25/21 23:59:00 CDT, Routine, Abnormal Heart Sound (Murmur), Stop date 03/25/21 23:59:00 CDT, Wheelchair, Standard Precautions, [facility], Order for future visit, Heart murmur  ?  AD - Alzheimers disease?G30.9  - as per HPI  - Continue Donepezil 10mg; will start Memantine 5mg 1 daily x 1?week,? then 1 BID x 1 week, then 2qam x 1 qpm x 1 week,?2 tab BID x 1 week,?then start 10mg 1?BID thereafter  Ordered:  Clinic Follow up, *Est. 05/25/21 3:00:00 CDT, Order for future visit, AD - Alzheimers disease  HTN (hypertension)  Osteoporosis, [facility]  ?  Osteoporosis?M81.0  -Continue Calcium  Carbonate 500mg daily  Ordered:  Clinic Follow up, *Est. 05/25/21 3:00:00 CDT, Order for future visit, AD - Alzheimers disease  HTN (hypertension)  Osteoporosis, [facility]  ?  Orders:  memantine, See Instructions, Take 1 tablet at nigth for 7 days, then take 1 tablet in morning and 1 at night for the next 7 days, then take 2 in the morning and one at night for the next 7 days, and then take 2 in the morning and two at night for the next 7 days...  memantine, 10 mg = 1 tab(s), Oral, BID, # 60 tab(s), 1 Refill(s), Pharmacy: [facility]/pharmacy #[id number], 150, cm, Height/Length Dosing, 03/25/21 13:22:00 CDT, 58.8, kg, Weight Dosing, 03/25/21 13:22:00 CDT  Referrals  Clinic Follow up, *Est. 05/25/21 3:00:00 CDT, Order for future visit, AD - Alzheimers disease  HTN (hypertension)  Osteoporosis, [facility]   Problem List/Past Medical History  Ongoing  AD - Alzheimers disease  HLD (hyperlipidemia)  HTN (hypertension)  Hypertension  Impaired gait and mobility  Osteoarthritis  Historical  No qualifying data  Procedure/Surgical History  denies   Medications  aspirin 81 mg oral tablet, CHEWABLE, 81 mg= 1 tab(s), Chewed, Daily, 6 refills  Blood pressure CUff, See Instructions,? ?Not taking  brimonidine 0.15% ophthalmic solution, 1 drop(s), OPTH, q8hr, 4 refills  calcium (as carbonate) 500 mg oral tablet, chewable, 500 mg= 1 tab(s), Chewed, Daily, 6 refills,? ?Not taking  donepezil 10 mg oral tablet, 10 mg= 1 tab(s), Oral, Once a day (at bedtime)  DORZOLAMIDE-TIMOLOL EYE DROPS  LATANOPROST 0.005% EYE DROPS  lisinopril 5 mg oral tablet, 5 mg= 1 tab(s), Oral, Daily, 3 refills  memantine 10 mg oral tablet, 10 mg= 1 tab(s), Oral, BID, 1 refills  memantine 5 mg oral tablet, See Instructions  Rollator, See Instructions  Allergies  No Known Allergies  Family History  Old age: Mother, Father and Sister.  Immunizations  Vaccine Date Status   COVID-19 MRNA, BEKA, HARRY- Pfizer 02/05/2021 Given   COVID-19 MRNA,  BEKA, HARRY- Pfizer 01/15/2021 Given   pneumococcal 23-polyvalent vaccine 12/17/2020 Given   zoster vaccine, inactivated 11/06/2020 Given   influenza virus vaccine, inactivated 11/06/2020 Given   influenza virus vaccine, inactivated 10/10/2018 Given   zoster vaccine, inactivated 06/22/2018 Recorded   influenza virus vaccine, inactivated 02/12/2018 Given   pneumococcal 13-valent conjugate vaccine 07/28/2017 Given   tetanus/diphtheria/pertussis, acel(Tdap) 07/28/2017 Given       I discussed and agree with the residents findings and plan as documented in the residents note.  ?  AD- agree with starting memantine, Fol-4.5  start folate  son to contnue to set up pillbox  continue 24hr care  This pt may benefit from referral to [facility] program in future (since she needs assistance with ADLs)

## 2022-05-03 NOTE — HISTORICAL [facility] CERNER
This is a historical note converted from [place]. Formatting and pictures may have been removed.  Please reference [place] for original formatting and attached multimedia. Chief Complaint  follow up;  History of Present Illness  A 89-year-old female well-known to me who Im seeing today for evaluation of possible dementia brought in by?son.? Could not do MRI?earlier this week secondary to being claustrophobic.? Patient denies any complaints currently is seen in a wheelchair with her caregiver, says shes able to evaluate her home with a walker?on her own, no chest pain no shortness of breath no nausea vomiting lower extremity swelling syncope/presyncope/LOC/Problems with sleep  ?  MMSE: 13  Review of Systems  Negative unless otherwise stated above  Physical Exam  Vitals & Measurements  T:?36.7? ?C (Oral)? HR:?60(Peripheral)? RR:?18? BP:?112/76?  HT:?150.00?cm? WT:?59.600?kg? BMI:?26.49?  Gen: No acute distress, afebrile  Chest: CTAB, No wheeze, rhonci or additional sounds  Heart:?S1,S2 heard, no appreciable murmur  Abd:?BS+, soft, non tender  Extremity:?warm, no LE edema  Neurologic:?alert and oriented x3,?cranial nerves II through XII grossly intact, power 5 x 5 bilateral upper and lower extremities, sensation intact  Assessment/Plan  Dementia  -CBC, electrolytes, glucose, vitamin D, vitamin?B-12, thyroid all normal  -Could not do MRI was feeling claustrophobic, will order CT without contrast, also RPR, HIV, hepatitis  -MMSE score:?13, severe cognitive impairment, will refer to Yudi  ?   Primary HTN  Controlled this visit 112/76  Currently taking lisinopril, compliant with medication  DASH diet advised  ?   ?Post Menopausal Osteoporosis  Being treated with Alendronate 70 mg qWeek, patient has been on alendronate for last 5 years, despite 5 years of alendronate her T score is still -2.7?on the most recent DEXA scan. ?She qualifies for [employer], will place prior authorization.  Vitamin D, calcium level normal  ?    ?Dyslipidemia  Continue?pravastatin 20 mg  ?   ??Unstable gait  Able to?walk only with a?cane  No history of falls  Prescribed rolling walker with chair for?her unsteady gait  ?   ?Health Maintenance  On ASA?and statin for CRC prevention & CV health  Uptodate on TDaP & Pneumococcal &flu(today), and Shingles today  ?   For further workup of possible dementia will order CT head, RPR, HIV, hepatitis. Will refer to Yudi for severe cognitive imparment   Problem List/Past Medical History  Ongoing  HLD (hyperlipidemia)  HTN (hypertension)  Hypertension  Historical  No qualifying data  Procedure/Surgical History  denies   Medications  aspirin 81 mg oral tablet, CHEWABLE, 81 mg= 1 tab(s), Chewed, Daily, 6 refills  Blood pressure CUff, See Instructions  brimonidine 0.15% ophthalmic solution, 1 drop(s), OPTH, q8hr, 4 refills  calcium (as carbonate) 500 mg oral tablet, chewable, 500 mg= 1 tab(s), Chewed, Daily, 6 refills  DORZOLAMIDE-TIMOLOL EYE DROPS  ferrous sulfate 325 mg (65 mg elemental iron) oral enteric coated tablet, 325 mg= 1 tab(s), Oral, M,W,F,Mendez, 6 refills  influenza virus vaccine, inactivated quadrivalent intramuscular suspension, 0.5 mL, IM, Once-Unscheduled  LATANOPROST 0.005% EYE DROPS  lisinopril 5 mg oral tablet, 5 mg= 1 tab(s), Oral, Daily, 3 refills  pravastatin 40 mg oral tablet, 40 mg= 1 tab(s), Oral, Daily, 3 refills  Roller walker with chair, See Instructions  Shingrix, 0.5 mL, IM, Once-NOW  Allergies  No Known Allergies  Social History  Abuse/Neglect  No, No, Yes, 02/24/2020  Alcohol  Never, 06/24/2017  Employment/School  Retired, 07/28/2017  Exercise  Self assessment: Fair condition., 07/28/2017  Home/Environment  Lives with LIVES WITH DAUGHTER/GRANDCHILD. Living situation: Home with assistance., 02/12/2018  Nutrition/Health  Regular, 07/28/2017  Substance Use  Never, 06/24/2017  Tobacco  Never (less than 100 in lifetime), N/A, 02/24/2020  Family History  Family history is  negative  Immunizations  Vaccine Date Status   influenza virus vaccine, inactivated 10/10/2018 Given   influenza virus vaccine, inactivated 02/12/2018 Given   pneumococcal 13-valent conjugate vaccine 07/28/2017 Given   tetanus/diphtheria/pertussis, acel(Tdap) 07/28/2017 Given   Health Maintenance  Health Maintenance  ???Pending?(in the next year)  ??? ??Due?  ??? ? ? ?Influenza Vaccine due??10/01/20??and every 1??day(s)  ??? ? ? ?Hypertension Management-Education due??11/06/20??and every 1??year(s)  ??? ? ? ?Medicare Annual Wellness Exam due??11/06/20??and every 1??year(s)  ??? ? ? ?Pneumococcal Vaccine due??11/06/20??Unknown Frequency  ??? ? ? ?Zoster Vaccine due??11/06/20??Unknown Frequency  ??? ??Refused?  ??? ? ? ?Advance Directive due??01/02/21??and every 1??year(s)  ??? ??Due In Future?  ??? ? ? ?Obesity Screening not due until??01/01/21??and every 1??year(s)  ??? ? ? ?Cognitive Screening not due until??01/02/21??and every 1??year(s)  ??? ? ? ?Fall Risk Assessment not due until??01/02/21??and every 1??year(s)  ??? ? ? ?Functional Assessment not due until??01/02/21??and every 1??year(s)  ??? ? ? ?Blood Pressure Screening not due until??02/23/21??and every 1??year(s)  ??? ? ? ?Body Mass Index Check not due until??02/23/21??and every 1??year(s)  ??? ? ? ?Depression Screening not due until??02/23/21??and every 1??year(s)  ??? ? ? ?Hypertension Management-Blood Pressure not due until??02/23/21??and every 1??year(s)  ??? ? ? ?ADL Screening not due until??02/24/21??and every 1??year(s)  ??? ? ? ?Bone Density Screening not due until??09/05/21??and every 2??year(s)  ??? ? ? ?Hypertension Management-BMP not due until??09/28/21??and every 1??year(s)  ???Satisfied?(in the past 1 year)  ??? ??Satisfied?  ??? ? ? ?ADL Screening on??02/24/20.??Satisfied by Regina Nicholas LPN  ??? ? ? ?Advance Directive on??02/24/20.??Satisfied by Regina Nicholas LPN  ??? ? ? ?Blood Pressure Screening on??02/24/20.??Satisfied by  Regina Nicholas LPN  ??? ? ? ?Body Mass Index Check on??02/24/20.??Satisfied by Regina Nicholas LPN  ??? ? ? ?Cognitive Screening on??02/24/20.??Satisfied by Regina Nicholas LPN  ??? ? ? ?Coronary Artery Disease Maintenance-Lipid Lowering Therapy on??08/10/20.??Satisfied by Savanna Ardon MD  ??? ? ? ?Depression Screening on??02/24/20.??Satisfied by Regina Nicholas LPN  ??? ? ? ?Diabetes Screening on??09/28/20.??Satisfied by Carlito Shultz  ??? ? ? ?Fall Risk Assessment on??02/24/20.??Satisfied by Regina Nicholas LPN  ??? ? ? ?Functional Assessment on??02/24/20.??Satisfied by Regina Nicholas LPN  ??? ? ? ?Hypertension Management-BMP on??09/28/20.??Satisfied by Carlito Shultz  ??? ? ? ?Obesity Screening on??02/24/20.??Satisfied by Regina Nicholas LPN  ??? ??Refused?  ??? ? ? ?Advance Directive on??02/24/20.??Recorded by Regina Nicholas LPN??Reason: Patient Refuses  ?      Reviewed present illness, physical exam, assessment/plan of resident. Case discussed with the resident. Care provided is reasonable and necessary.  Work up for severe cognitive impairment  Geriatrics referral

## 2022-05-03 NOTE — HISTORICAL [facility] CERNER
This is a historical note converted from [place]. Formatting and pictures may have been removed.  Please reference [employer] for original formatting and attached multimedia. Chief Complaint  Aggessive,combative behaviors.  History of Present Illness  89 yo old female here for aggressive behavior  Pt is regular patient of DR Nunes in this [facility] and I am meeting for the first time  son called for appt because patient more aggressive, argumentation and resistant last few days- week  has 5 children who?look after her but son who lives with her does her meds and oversees her care  ?  per pt - does not drive, no falls, gets in and out tub on her own, does not cook but does light house work and can operate her washer and dryer  hearing intact, wears glasses and sees well  does not think she is forgetful, tells me detailed stories about her , how they met and that she has been depressed more lately since thinking about him  (he ?several years back)  does not think she has been challenging but does report that she gets upset when she thinks she is not the boss anymore  here with dtr in law? who recommends I call the son who lives with her for more history  aggression, behavior and personality changes started all of a sudden last week  no new environmental factors  sleeps well at night, decreased appt for few months, lost 7 lbs in last two months  Review of Systems  Constitutional:?no fever, fatigue, occ depressed mood when thinks about her , gets mad at times  ENMT:?no sore throat, ear pain, sinus pain/congestion, nasal congestion/drainage  Respiratory:?no cough, wheezing, shortness of breath  Cardiovascular:?no chest pain, palpitations, edema  Gastrointestinal:?no nausea, vomiting, or diarrhea, no abdominal pain  Genitourinary:?no dysuria, urinary frequency, urgency, hematuria  Neurologic: no headache, dizziness,? numbness, vision changes  ?  Physical Exam  Vitals & Measurements  T:?36.5? ?C (Oral)?  HR:?70(Peripheral)? RR:?20? BP:?113/69? SpO2:?99%?  HT:?150.00?cm? WT:?55.400?kg? BMI:?24.62?  General - NAD, comfortable, petit,sweet and calm, converses easily and keeps up with rapid pace on conversation, although some of her responses are not totally correct,happy  HENT_ nl TM,EAC, dary w/o redness  Neck - no node, mass ,thyromegaly  Respiratory - CTA BL, no distress  Cardiovascular - RRR, no murmur  Gastrointestinal - soft NT, no mass, nl BS x 4?, no SP, CVA tend  Neurologic - no focal deficits, nl CN, slow and steady gait with cane, A/O x 4  Assessment/Plan  Dementia with behavior disturbances  Depressed Mood  Weight loss, decreased appt  ?  ?  PLAN  lab/ABD US, CXR, UC  MRI Brain for acute change in personality and behavior  OV 50 min obtaining h.o, reviewing chart and discussing testing  RTC 3-4 weeks for FU  Referrals  Clinic Follow up, *Est. 06/01/21 3:00:00 CDT, dementia, on a thursday, Order for future visit, AD - Alzheimers disease  HTN (hypertension), [facility]   Problem List/Past Medical History  Ongoing  AD - Alzheimers disease  HLD (hyperlipidemia)  HTN (hypertension)  Osteoarthritis  Procedure/Surgical History  denies   Medications  aspirin 81 mg oral tablet, CHEWABLE, 81 mg= 1 tab(s), Chewed, Daily, 6 refills  Blood pressure CUff, See Instructions,? ?Not taking  brimonidine 0.15% ophthalmic solution, 1 drop(s), OPTH, q8hr, 4 refills  calcium (as carbonate) 500 mg oral tablet, chewable, 500 mg= 1 tab(s), Chewed, Daily, 6 refills  donepezil 10 mg oral tablet, See Instructions  DORZOLAMIDE-TIMOLOL EYE DROPS  folic acid 1 mg oral tablet, 1 mg= 1 tab(s), Oral, Daily, 1 refills  LATANOPROST 0.005% EYE DROPS  lisinopril 5 mg oral tablet, 5 mg= 1 tab(s), Oral, Daily, 3 refills  memantine 10 mg oral tablet, See Instructions  Rollator, See Instructions  Allergies  No Known Allergies  Immunizations  Vaccine Date Status   COVID-19 MRNA, LNP-S, HARRY- Pfizer 02/05/2021 Given   COVID-19 MRNA,  KAYKAY-S, PF- Pfizer 01/15/2021 Given   pneumococcal 23-polyvalent vaccine 2020 Given   zoster vaccine, inactivated 2020 Given   influenza virus vaccine, inactivated 2020 Given   influenza virus vaccine, inactivated 10/10/2018 Given   zoster vaccine, inactivated 2018 Recorded   influenza virus vaccine, inactivated 2018 Given   pneumococcal 13-valent conjugate vaccine 2017 Given   tetanus/diphtheria/pertussis, acel(Tdap) 2017 Given       spoke with son Alex who lives with patient at length  aggressive behavior this weekend started while at a  and he was helping her walk, she became resistant, accused him of hitting her and was pinching him  on further questioning he reports pt is having some typical features of Alzheimers Dementia, threatening to change the locks, confusion over details about how she is being cared for, calling out of town sibling with incorrect details  minimal?paranoia,possibly early delusions,?not emotionally labile, discussed options of Seroquel, including risks/benefits/boxed warning  he will discsuss with fly and let us know Monday  ?   pt had trouble with MRI in past and could not do ti,  did CT instead, if fly agrees to imaging may consider CT first, explained s/s typical for Alzheimers but with such?an abrupt change we would like to rule out intracranial pathology

## 2022-05-16 ENCOUNTER — LAB VISIT (OUTPATIENT)
Dept: LAB | Facility: HOSPITAL | Age: 87
End: 2022-05-16
Attending: STUDENT IN AN ORGANIZED HEALTH CARE EDUCATION/TRAINING PROGRAM
Payer: COMMERCIAL

## 2022-05-16 ENCOUNTER — OFFICE VISIT (OUTPATIENT)
Dept: INTERNAL MEDICINE | Facility: CLINIC | Age: 87
End: 2022-05-16
Payer: COMMERCIAL

## 2022-05-16 VITALS
SYSTOLIC BLOOD PRESSURE: 133 MMHG | HEIGHT: 59 IN | BODY MASS INDEX: 18.31 KG/M2 | HEART RATE: 66 BPM | DIASTOLIC BLOOD PRESSURE: 79 MMHG | TEMPERATURE: 98 F | RESPIRATION RATE: 18 BRPM | WEIGHT: 90.81 LBS

## 2022-05-16 DIAGNOSIS — F02.80 ALZHEIMER DISEASE: ICD-10-CM

## 2022-05-16 DIAGNOSIS — I10 HYPERTENSION, UNSPECIFIED TYPE: Chronic | ICD-10-CM

## 2022-05-16 DIAGNOSIS — I10 HYPERTENSION, UNSPECIFIED TYPE: ICD-10-CM

## 2022-05-16 DIAGNOSIS — R62.7 FAILURE TO THRIVE IN ADULT: ICD-10-CM

## 2022-05-16 DIAGNOSIS — M81.0 AGE RELATED OSTEOPOROSIS, UNSPECIFIED PATHOLOGICAL FRACTURE PRESENCE: Primary | ICD-10-CM

## 2022-05-16 DIAGNOSIS — F02.80 ALZHEIMER DISEASE: Chronic | ICD-10-CM

## 2022-05-16 DIAGNOSIS — G30.9 ALZHEIMER DISEASE: Chronic | ICD-10-CM

## 2022-05-16 DIAGNOSIS — G30.9 ALZHEIMER DISEASE: ICD-10-CM

## 2022-05-16 DIAGNOSIS — E78.5 HYPERLIPIDEMIA, UNSPECIFIED HYPERLIPIDEMIA TYPE: Chronic | ICD-10-CM

## 2022-05-16 LAB
ALBUMIN SERPL-MCNC: 3.2 GM/DL (ref 3.4–4.8)
ALBUMIN/GLOB SERPL: 0.8 RATIO (ref 1.1–2)
ALP SERPL-CCNC: 59 UNIT/L (ref 40–150)
ALT SERPL-CCNC: 10 UNIT/L (ref 0–55)
AST SERPL-CCNC: 23 UNIT/L (ref 5–34)
BASOPHILS # BLD AUTO: 0.05 X10(3)/MCL (ref 0–0.2)
BASOPHILS NFR BLD AUTO: 1 %
BILIRUBIN DIRECT+TOT PNL SERPL-MCNC: 0.3 MG/DL
BUN SERPL-MCNC: 11.1 MG/DL (ref 9.8–20.1)
CALCIUM SERPL-MCNC: 9.9 MG/DL (ref 8.4–10.2)
CHLORIDE SERPL-SCNC: 105 MMOL/L (ref 98–111)
CHOLEST SERPL-MCNC: 179 MG/DL
CHOLEST/HDLC SERPL: 4 {RATIO} (ref 0–5)
CO2 SERPL-SCNC: 25 MMOL/L (ref 23–31)
CREAT SERPL-MCNC: 1.12 MG/DL (ref 0.55–1.02)
DEPRECATED CALCIDIOL+CALCIFEROL SERPL-MC: 50.9 NG/ML (ref 30–80)
EOSINOPHIL # BLD AUTO: 0.08 X10(3)/MCL (ref 0–0.9)
EOSINOPHIL NFR BLD AUTO: 1.6 %
ERYTHROCYTE [DISTWIDTH] IN BLOOD BY AUTOMATED COUNT: 14.6 % (ref 11.5–17)
EST. AVERAGE GLUCOSE BLD GHB EST-MCNC: 93.9 MG/DL
GLOBULIN SER-MCNC: 3.8 GM/DL (ref 2.4–3.5)
GLUCOSE SERPL-MCNC: 85 MG/DL (ref 75–121)
HBA1C MFR BLD: 4.9 %
HCT VFR BLD AUTO: 35 % (ref 37–47)
HDLC SERPL-MCNC: 49 MG/DL (ref 35–60)
HGB BLD-MCNC: 11.3 GM/DL (ref 12–16)
IMM GRANULOCYTES # BLD AUTO: 0.01 X10(3)/MCL (ref 0–0.02)
IMM GRANULOCYTES NFR BLD AUTO: 0.2 % (ref 0–0.43)
LDLC SERPL CALC-MCNC: 98 MG/DL (ref 50–140)
LYMPHOCYTES # BLD AUTO: 1.73 X10(3)/MCL (ref 0.6–4.6)
LYMPHOCYTES NFR BLD AUTO: 34.7 %
MCH RBC QN AUTO: 27 PG (ref 27–31)
MCHC RBC AUTO-ENTMCNC: 32.3 MG/DL (ref 33–36)
MCV RBC AUTO: 83.7 FL (ref 80–94)
MONOCYTES # BLD AUTO: 0.39 X10(3)/MCL (ref 0.1–1.3)
MONOCYTES NFR BLD AUTO: 7.8 %
NEUTROPHILS # BLD AUTO: 2.7 X10(3)/MCL (ref 2.1–9.2)
NEUTROPHILS NFR BLD AUTO: 54.7 %
NRBC BLD AUTO-RTO: 0 %
PLATELET # BLD AUTO: 309 X10(3)/MCL (ref 130–400)
PMV BLD AUTO: 9.3 FL (ref 9.4–12.4)
POTASSIUM SERPL-SCNC: 4 MMOL/L (ref 3.5–5.1)
PROT SERPL-MCNC: 7 GM/DL (ref 5.8–7.6)
RBC # BLD AUTO: 4.18 X10(6)/MCL (ref 4.2–5.4)
SODIUM SERPL-SCNC: 138 MMOL/L (ref 132–146)
TRIGL SERPL-MCNC: 159 MG/DL (ref 37–140)
TSH SERPL-ACNC: 0.78 UIU/ML (ref 0.35–4.94)
VLDLC SERPL CALC-MCNC: 32 MG/DL
WBC # SPEC AUTO: 5 X10(3)/MCL (ref 4.5–11.5)

## 2022-05-16 PROCEDURE — 80053 COMPREHEN METABOLIC PANEL: CPT

## 2022-05-16 PROCEDURE — 80061 LIPID PANEL: CPT

## 2022-05-16 PROCEDURE — 99213 OFFICE O/P EST LOW 20 MIN: CPT | Mod: PBBFAC

## 2022-05-16 PROCEDURE — 82306 VITAMIN D 25 HYDROXY: CPT

## 2022-05-16 PROCEDURE — 84443 ASSAY THYROID STIM HORMONE: CPT

## 2022-05-16 PROCEDURE — 85025 COMPLETE CBC W/AUTO DIFF WBC: CPT

## 2022-05-16 PROCEDURE — 83036 HEMOGLOBIN GLYCOSYLATED A1C: CPT

## 2022-05-16 PROCEDURE — 36415 COLL VENOUS BLD VENIPUNCTURE: CPT

## 2022-05-16 RX ORDER — MEMANTINE HYDROCHLORIDE 10 MG/1
10 TABLET ORAL 2 TIMES DAILY
Qty: 90 TABLET | Refills: 3 | Status: SHIPPED | OUTPATIENT
Start: 2022-05-16

## 2022-05-16 RX ORDER — DONEPEZIL HYDROCHLORIDE 10 MG/1
10 TABLET, FILM COATED ORAL NIGHTLY
COMMUNITY
Start: 2022-03-12 | End: 2022-05-16 | Stop reason: SDUPTHER

## 2022-05-16 RX ORDER — ACETAMINOPHEN 500 MG
500 TABLET ORAL EVERY 6 HOURS PRN
COMMUNITY

## 2022-05-16 RX ORDER — DORZOLAMIDE HYDROCHLORIDE AND TIMOLOL MALEATE 20; 5 MG/ML; MG/ML
1 SOLUTION/ DROPS OPHTHALMIC 2 TIMES DAILY
COMMUNITY
Start: 2022-04-08

## 2022-05-16 RX ORDER — MEMANTINE HYDROCHLORIDE 10 MG/1
10 TABLET ORAL 2 TIMES DAILY
COMMUNITY
Start: 2022-03-17 | End: 2022-05-16 | Stop reason: SDUPTHER

## 2022-05-16 RX ORDER — LISINOPRIL 5 MG/1
5 TABLET ORAL DAILY
COMMUNITY
Start: 2022-04-20 | End: 2022-05-16 | Stop reason: SDUPTHER

## 2022-05-16 RX ORDER — FOLIC ACID 1 MG/1
1000 TABLET ORAL DAILY
COMMUNITY
Start: 2022-01-03 | End: 2022-05-16 | Stop reason: SDUPTHER

## 2022-05-16 RX ORDER — BRIMONIDINE TARTRATE 1.5 MG/ML
SOLUTION/ DROPS OPHTHALMIC
COMMUNITY
Start: 2022-04-20

## 2022-05-16 RX ORDER — DONEPEZIL HYDROCHLORIDE 10 MG/1
10 TABLET, FILM COATED ORAL NIGHTLY
Qty: 90 TABLET | Refills: 2 | Status: SHIPPED | OUTPATIENT
Start: 2022-05-16

## 2022-05-16 RX ORDER — LISINOPRIL 5 MG/1
5 TABLET ORAL DAILY
Qty: 90 TABLET | Refills: 3 | Status: SHIPPED | OUTPATIENT
Start: 2022-05-16

## 2022-05-16 RX ORDER — FOLIC ACID 1 MG/1
1000 TABLET ORAL DAILY
Qty: 90 TABLET | Refills: 3 | Status: SHIPPED | OUTPATIENT
Start: 2022-05-16
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
## 2022-05-17 ENCOUNTER — PATIENT OUTREACH (OUTPATIENT)
Dept: ADMINISTRATIVE | Facility: HOSPITAL | Age: 87
End: 2022-05-17
Payer: COMMERCIAL

## 2022-05-18 NOTE — PROGRESS NOTES
Attending Addendum:   Patient seen and examined in clinic. Management and Plan were discussed with resident. Care was reasonable and necessary.   Martha Ray MD  [facility] - Internal Medicine